# Patient Record
Sex: MALE
[De-identification: names, ages, dates, MRNs, and addresses within clinical notes are randomized per-mention and may not be internally consistent; named-entity substitution may affect disease eponyms.]

---
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
## 2018-01-13 ENCOUNTER — HOSPITAL ENCOUNTER (EMERGENCY)
Dept: HOSPITAL 45 - C.EDA | Age: 18
Discharge: HOME | End: 2018-01-13
Payer: COMMERCIAL

## 2018-01-13 VITALS — HEART RATE: 82 BPM | SYSTOLIC BLOOD PRESSURE: 127 MMHG | DIASTOLIC BLOOD PRESSURE: 75 MMHG | OXYGEN SATURATION: 98 %

## 2018-01-13 VITALS — HEIGHT: 69.02 IN

## 2018-01-13 VITALS — TEMPERATURE: 98.42 F

## 2018-01-13 DIAGNOSIS — F10.920: Primary | ICD-10-CM

## 2018-01-13 LAB
BUN SERPL-MCNC: 10 MG/DL (ref 7–18)
CALCIUM SERPL-MCNC: 8.4 MG/DL (ref 8.5–10.1)
CO2 SERPL-SCNC: 24 MMOL/L (ref 21–32)
CREAT SERPL-MCNC: 1.18 MG/DL (ref 0.6–1.4)
GLUCOSE SERPL-MCNC: 96 MG/DL (ref 70–99)
POTASSIUM SERPL-SCNC: 3.8 MMOL/L (ref 3.5–5.1)
SODIUM SERPL-SCNC: 141 MMOL/L (ref 136–145)

## 2018-01-13 NOTE — EMERGENCY ROOM VISIT NOTE
History


Report prepared by Debbie:  Ameena Merlos


Under the Supervision of:  Dr. Lani Corley D.O.


First contact with patient:  00:28


Chief Complaint:  ALCOHOL OVERDOSE


Stated Complaint:  ALCOHOL OVERDOSE





History of Present Illness


The patient is a 17 year old male who presents to the Emergency Room with 

persistent alcohol intoxication starting PTA. The patient presents to the ED by 

EMS. He was drinking vodka tonight. He returned to his dorm room and was 

vomiting. His roommate was concerned and called EMS. He denies any abdominal 

pain or any other pain. He denies any fall or injury. He is no longer nauseous. 

He does not smoke. The history is limited due to intoxication.





   Source of History:  patient, nursing staff


   History Limited By:  intoxication


   Onset:  PTA


   Position:  other (global)


   Quality:  other (alcohol intoxication)


   Timing:  other (persistent)


   Associated Symptoms:  + nausea (resolved), + vomiting (resolved), No 

abdominal pain





Review of Systems


See HPI for pertinent positives & negatives. A total of 10 systems reviewed and 

were otherwise negative.





Past Medical & Surgical


No significant past medical history.





Family History


No pertinent family history stated.





Social History


Housing Status:  lives with roommate


Occupation Status:  [employer] student





Current/Historical Medications


No Active Prescriptions or Reported Meds





Allergies


Coded Allergies:  


     No Known Allergies (Unverified , 1/13/18)





Physical Exam


Vital Signs











  Date Time  Temp Pulse Resp B/P (MAP) Pulse Ox O2 Delivery O2 Flow Rate FiO2


 


1/13/18 06:10  82 18 127/75 98 Room Air  


 


1/13/18 05:20  82 18 113/61 95 Room Air  


 


1/13/18 04:18  73      


 


1/13/18 03:07  74 18 113/68 96 Room Air  


 


1/13/18 02:47  74 16 121/57 97 Room Air  


 


1/13/18 01:44  73 16 120/57 97 Room Air  


 


1/13/18 00:40  67      


 


1/13/18 00:35      Room Air  


 


1/13/18 00:35      Room Air  


 


1/13/18 00:32 36.9 59 16 121/71 97 Room Air  











Physical Exam


GENERAL: smells of alcohol, alert, well appearing, well nourished, no distress, 

non-toxic 


EYE EXAM: normal conjunctiva, PERRL and EOM's grossly intact


OROPHARYNX: no exudate, no erythema, lips, buccal mucosa, and tongue normal and 

mucous membranes are moist


NECK: supple, no nuchal rigidity, no adenopathy, non-tender


LUNGS: Clear to auscultation. Normal chest wall mechanics


HEART: no murmurs, S1 normal and S2 normal 


ABDOMEN: abdomen soft, non-tender, normo-active bowel sounds, no masses, no 

rebound or guarding. 


BACK: Back is symmetrical on inspection and there is no deformity, no midline 

tenderness, no CVA tenderness. 


SKIN: no rashes and no bruising 


UPPER EXTREMITIES: upper extremities are grossly normal. 


LOWER EXTREMITIES: No pitting edema.


NEURO EXAM: Normal sensorium, cranial nerves II-XII grossly intact, normal 

speech,  no gross weakness of arms, no gross weakness of legs.





Medical Decision & Procedures


Laboratory Results


1/13/18 00:32

















Test


  1/13/18


00:32 1/13/18


00:40


 


Anion Gap


  8.0 mmol/L


(3-11) 


 


 


Estimated GFR (


American) 103.1 


  


 


 


Estimated GFR (Non-


American 88.9 


  


 


 


BUN/Creatinine Ratio 8.5 (10-20)  


 


Calcium Level


  8.4 mg/dl


(8.5-10.1) 


 


 


Ethyl Alcohol mg/dL


  202.0 mg/dl


(0-3) 


 


 


Bedside Glucose


  


  95 mg/dl


(70-99)





Laboratory results per my review.





ED Course


0031: The patient was evaluated in room A11B. A complete history and physical 

exam was performed. 





0551: Upon reevaluation, the patient is awake and talking. I discussed the 

findings and the treatment plan with the patient.  He verbalizes agreement and 

understanding.  He was discharged home.





Medical Decision


Differential diagnosis:


Etiologies such as alcohol intoxication, toxicologic, infection, hypoglycemia, 

electrolyte abnormalities, cardiac sources, intracerebral event, neurologic, as 

well as others were entertained.





Patient ill-appearing here, no evidence of trauma.  On re-exam patient awake 

and alert, no complaints, vital signs stable throughout.  I have a low 

suspicion of any occult traumatic injury.  Discussed with patient symptoms 

watch return for, he verbalized understanding was agreeable with plan.





Medication Reconcilliation


Current Medication List:  was personally reviewed by me





Blood Pressure Screening


Patient's blood pressure:  Normal blood pressure


Blood pressure disposition:  Did not require urgent referral





Impression





 Primary Impression:  


 Alcoholic intoxication





Scribe Attestation


The scribe's documentation has been prepared under my direction and personally 

reviewed by me in its entirety. I confirm that the note above accurately 

reflects all work, treatment, procedures, and medical decision making performed 

by me.





Departure Information


Dispostion


Home / Self-Care





Prescriptions





No Active Prescriptions or Reported Meds





Patient Instructions


[facility]





Additional Instructions





Please do not drink alcohol until you're legally allowed at age 21.  When you 

do decide to drink please do so responsibly in a safe location.  Do not drink 

and drive.  If you have any other new concerns, please return the emergency 

room.





Problem Qualifiers








 Primary Impression:  


 Alcoholic intoxication


 Complication of substance-induced condition:  uncomplicated  Qualified Codes:  

F10.920 - Alcohol use, unspecified with intoxication, uncomplicated